# Patient Record
Sex: FEMALE | URBAN - METROPOLITAN AREA
[De-identification: names, ages, dates, MRNs, and addresses within clinical notes are randomized per-mention and may not be internally consistent; named-entity substitution may affect disease eponyms.]

---

## 2022-03-28 ENCOUNTER — APPOINTMENT (OUTPATIENT)
Dept: URBAN - METROPOLITAN AREA CLINIC 260 | Age: 27
Setting detail: DERMATOLOGY
End: 2022-03-29

## 2022-03-28 VITALS — HEIGHT: 66 IN | WEIGHT: 180 LBS

## 2022-03-28 DIAGNOSIS — Q819 OTHER SPECIFIED ANOMALIES OF SKIN: ICD-10-CM

## 2022-03-28 DIAGNOSIS — Q826 OTHER SPECIFIED ANOMALIES OF SKIN: ICD-10-CM

## 2022-03-28 DIAGNOSIS — L663 OTHER SPECIFIED DISEASES OF HAIR AND HAIR FOLLICLES: ICD-10-CM

## 2022-03-28 DIAGNOSIS — L738 OTHER SPECIFIED DISEASES OF HAIR AND HAIR FOLLICLES: ICD-10-CM

## 2022-03-28 DIAGNOSIS — Q828 OTHER SPECIFIED ANOMALIES OF SKIN: ICD-10-CM

## 2022-03-28 PROBLEM — L02.221 FURUNCLE OF ABDOMINAL WALL: Status: ACTIVE | Noted: 2022-03-28

## 2022-03-28 PROBLEM — L85.8 OTHER SPECIFIED EPIDERMAL THICKENING: Status: ACTIVE | Noted: 2022-03-28

## 2022-03-28 PROCEDURE — OTHER PRESCRIPTION: OTHER

## 2022-03-28 PROCEDURE — OTHER ADDITIONAL NOTES: OTHER

## 2022-03-28 PROCEDURE — OTHER COUNSELING: OTHER

## 2022-03-28 PROCEDURE — 99204 OFFICE O/P NEW MOD 45 MIN: CPT

## 2022-03-28 PROCEDURE — OTHER MIPS QUALITY: OTHER

## 2022-03-28 PROCEDURE — OTHER PHOTO-DOCUMENTATION: OTHER

## 2022-03-28 PROCEDURE — OTHER EDUCATIONAL RESOURCES PROVIDED: OTHER

## 2022-03-28 PROCEDURE — OTHER PRESCRIPTION MEDICATION MANAGEMENT: OTHER

## 2022-03-28 RX ORDER — CEPHALEXIN 500 MG/1
500 MG CAPSULE ORAL BID
Qty: 60 | Refills: 1 | Status: ERX | COMMUNITY
Start: 2022-03-28

## 2022-03-28 ASSESSMENT — BSA RASH: BSA RASH: 10

## 2022-03-28 ASSESSMENT — LOCATION ZONE DERM
LOCATION ZONE: ARM
LOCATION ZONE: TRUNK

## 2022-03-28 ASSESSMENT — LOCATION DETAILED DESCRIPTION DERM
LOCATION DETAILED: LEFT INFRAMAMMARY CREASE (INNER QUADRANT)
LOCATION DETAILED: LEFT PROXIMAL POSTERIOR UPPER ARM

## 2022-03-28 ASSESSMENT — LOCATION SIMPLE DESCRIPTION DERM
LOCATION SIMPLE: LEFT POSTERIOR UPPER ARM
LOCATION SIMPLE: LEFT BREAST

## 2022-03-28 ASSESSMENT — SEVERITY ASSESSMENT: SEVERITY: MILD TO MODERATE

## 2022-03-28 NOTE — PROCEDURE: PRESCRIPTION MEDICATION MANAGEMENT
Detail Level: Simple
Render In Strict Bullet Format?: No
Initiate Treatment: Cephalexin 500 mg BID
Plan: Return in 1 month for recheck.

## 2022-03-28 NOTE — PROCEDURE: ADDITIONAL NOTES
Detail Level: Zone
Additional Notes: Patient given options for OTC scrubs of the arms every other day and use OTC lotions and creams to apply topically to arms. Patient verbalized understanding.
Render Risk Assessment In Note?: no

## 2022-04-27 ENCOUNTER — APPOINTMENT (OUTPATIENT)
Dept: URBAN - METROPOLITAN AREA CLINIC 260 | Age: 27
Setting detail: DERMATOLOGY
End: 2022-04-29

## 2022-04-27 VITALS — HEIGHT: 66 IN | WEIGHT: 179 LBS

## 2022-04-27 DIAGNOSIS — L738 OTHER SPECIFIED DISEASES OF HAIR AND HAIR FOLLICLES: ICD-10-CM

## 2022-04-27 DIAGNOSIS — L663 OTHER SPECIFIED DISEASES OF HAIR AND HAIR FOLLICLES: ICD-10-CM

## 2022-04-27 DIAGNOSIS — D49.2 NEOPLASM OF UNSPECIFIED BEHAVIOR OF BONE, SOFT TISSUE, AND SKIN: ICD-10-CM

## 2022-04-27 DIAGNOSIS — L50.3 DERMATOGRAPHIC URTICARIA: ICD-10-CM

## 2022-04-27 PROBLEM — L02.222 FURUNCLE OF BACK [ANY PART, EXCEPT BUTTOCK]: Status: ACTIVE | Noted: 2022-04-27

## 2022-04-27 PROCEDURE — OTHER ORDER TESTS: OTHER

## 2022-04-27 PROCEDURE — OTHER COUNSELING: OTHER

## 2022-04-27 PROCEDURE — 11102 TANGNTL BX SKIN SINGLE LES: CPT

## 2022-04-27 PROCEDURE — 99213 OFFICE O/P EST LOW 20 MIN: CPT | Mod: 25

## 2022-04-27 PROCEDURE — OTHER ADDITIONAL NOTES: OTHER

## 2022-04-27 PROCEDURE — OTHER BIOPSY BY SHAVE METHOD: OTHER

## 2022-04-27 ASSESSMENT — LOCATION DETAILED DESCRIPTION DERM
LOCATION DETAILED: LEFT INFERIOR MEDIAL UPPER BACK
LOCATION DETAILED: SUPERIOR THORACIC SPINE
LOCATION DETAILED: LEFT MEDIAL BREAST 10-11:00 REGION

## 2022-04-27 ASSESSMENT — BSA RASH: BSA RASH: 6

## 2022-04-27 ASSESSMENT — LOCATION SIMPLE DESCRIPTION DERM
LOCATION SIMPLE: LEFT BREAST
LOCATION SIMPLE: LEFT UPPER BACK
LOCATION SIMPLE: UPPER BACK

## 2022-04-27 ASSESSMENT — LOCATION ZONE DERM: LOCATION ZONE: TRUNK

## 2022-04-27 ASSESSMENT — SEVERITY ASSESSMENT: SEVERITY: MILD

## 2022-04-27 NOTE — PROCEDURE: BIOPSY BY SHAVE METHOD
Validate Triangulation: No
Size Of Lesion In Cm: 0
Electrodesiccation And Curettage Text: The wound bed was treated with electrodesiccation and curettage after the biopsy was performed.
Biopsy Method: Dermablade
Consent: Written consent was obtained and risks were reviewed including but not limited to scarring, infection, bleeding, scabbing, incomplete removal, nerve damage and allergy to anesthesia.
Notification Instructions: Patient will be notified of biopsy results. However, patient instructed to call the office if not contacted within 2 weeks.
Detail Level: Detailed
Cryotherapy Text: The wound bed was treated with cryotherapy after the biopsy was performed.
Dressing: bandage
Curettage Text: The wound bed was treated with curettage after the biopsy was performed.
Post-Care Instructions: I reviewed with the patient in detail post-care instructions. Patient is to keep the biopsy site dry overnight, and then apply vaseline and clean bandage daily.
Anesthesia Volume In Cc (Will Not Render If 0): 0.3
Was A Bandage Applied: Yes
Electrodesiccation Text: The wound bed was treated with electrodesiccation after the biopsy was performed.
Hemostasis: Drysol
Biopsy Type: H and E
Type Of Destruction Used: Curettage
Anesthesia Type: 1% lidocaine with epinephrine
Depth Of Biopsy: dermis
Wound Care: Petrolatum
Billing Type: Third-Party Bill
Information: Selecting Yes will display possible errors in your note based on the variables you have selected. This validation is only offered as a suggestion for you. PLEASE NOTE THAT THE VALIDATION TEXT WILL BE REMOVED WHEN YOU FINALIZE YOUR NOTE. IF YOU WANT TO FAX A PRELIMINARY NOTE YOU WILL NEED TO TOGGLE THIS TO 'NO' IF YOU DO NOT WANT IT IN YOUR FAXED NOTE.
Silver Nitrate Text: The wound bed was treated with silver nitrate after the biopsy was performed.

## 2022-04-27 NOTE — PROCEDURE: ADDITIONAL NOTES
Additional Notes: Recommended taking a daily allergy medication.
Render Risk Assessment In Note?: no
Detail Level: Zone

## 2024-12-26 ENCOUNTER — LAB REQUISITION (OUTPATIENT)
Dept: LAB | Facility: CLINIC | Age: 29
End: 2024-12-26
Payer: COMMERCIAL

## 2024-12-26 DIAGNOSIS — R20.2 PARESTHESIA OF SKIN: ICD-10-CM

## 2024-12-26 LAB
ALBUMIN SERPL BCG-MCNC: 4.5 G/DL (ref 3.5–5.2)
ALP SERPL-CCNC: 93 U/L (ref 40–150)
ALT SERPL W P-5'-P-CCNC: 21 U/L (ref 0–50)
ANION GAP SERPL CALCULATED.3IONS-SCNC: 13 MMOL/L (ref 7–15)
AST SERPL W P-5'-P-CCNC: 18 U/L (ref 0–45)
BASOPHILS # BLD AUTO: 0.1 10E3/UL (ref 0–0.2)
BASOPHILS NFR BLD AUTO: 1 %
BILIRUB SERPL-MCNC: 0.2 MG/DL
BUN SERPL-MCNC: 10.1 MG/DL (ref 6–20)
CALCIUM SERPL-MCNC: 9.2 MG/DL (ref 8.8–10.4)
CHLORIDE SERPL-SCNC: 103 MMOL/L (ref 98–107)
CREAT SERPL-MCNC: 0.73 MG/DL (ref 0.51–0.95)
EGFRCR SERPLBLD CKD-EPI 2021: >90 ML/MIN/1.73M2
EOSINOPHIL # BLD AUTO: 0.1 10E3/UL (ref 0–0.7)
EOSINOPHIL NFR BLD AUTO: 1 %
ERYTHROCYTE [DISTWIDTH] IN BLOOD BY AUTOMATED COUNT: 12.6 % (ref 10–15)
GLUCOSE SERPL-MCNC: 92 MG/DL (ref 70–99)
HCO3 SERPL-SCNC: 22 MMOL/L (ref 22–29)
HCT VFR BLD AUTO: 46.5 % (ref 35–47)
HGB BLD-MCNC: 14.9 G/DL (ref 11.7–15.7)
IMM GRANULOCYTES # BLD: 0.1 10E3/UL
IMM GRANULOCYTES NFR BLD: 1 %
LYMPHOCYTES # BLD AUTO: 2.3 10E3/UL (ref 0.8–5.3)
LYMPHOCYTES NFR BLD AUTO: 27 %
MCH RBC QN AUTO: 30.2 PG (ref 26.5–33)
MCHC RBC AUTO-ENTMCNC: 32 G/DL (ref 31.5–36.5)
MCV RBC AUTO: 94 FL (ref 78–100)
MONOCYTES # BLD AUTO: 0.6 10E3/UL (ref 0–1.3)
MONOCYTES NFR BLD AUTO: 7 %
NEUTROPHILS # BLD AUTO: 5.4 10E3/UL (ref 1.6–8.3)
NEUTROPHILS NFR BLD AUTO: 64 %
NRBC # BLD AUTO: 0 10E3/UL
NRBC BLD AUTO-RTO: 0 /100
PLATELET # BLD AUTO: 361 10E3/UL (ref 150–450)
POTASSIUM SERPL-SCNC: 4.1 MMOL/L (ref 3.4–5.3)
PROT SERPL-MCNC: 7.4 G/DL (ref 6.4–8.3)
RBC # BLD AUTO: 4.94 10E6/UL (ref 3.8–5.2)
SODIUM SERPL-SCNC: 138 MMOL/L (ref 135–145)
TSH SERPL DL<=0.005 MIU/L-ACNC: 1.25 UIU/ML (ref 0.3–4.2)
WBC # BLD AUTO: 8.5 10E3/UL (ref 4–11)

## 2024-12-26 PROCEDURE — 84443 ASSAY THYROID STIM HORMONE: CPT | Mod: ORL | Performed by: FAMILY MEDICINE

## 2024-12-26 PROCEDURE — 85025 COMPLETE CBC W/AUTO DIFF WBC: CPT | Mod: ORL | Performed by: FAMILY MEDICINE

## 2024-12-26 PROCEDURE — 80053 COMPREHEN METABOLIC PANEL: CPT | Mod: ORL | Performed by: FAMILY MEDICINE

## 2024-12-31 PROBLEM — N62 SYMPTOMATIC MAMMARY HYPERTROPHY: Status: ACTIVE | Noted: 2024-12-31

## 2024-12-31 PROBLEM — E78.5 HYPERLIPIDEMIA: Status: ACTIVE | Noted: 2017-03-02

## 2024-12-31 PROBLEM — G43.909 MIGRAINE SYNDROME: Status: ACTIVE | Noted: 2024-08-07

## 2025-01-02 ENCOUNTER — OFFICE VISIT (OUTPATIENT)
Dept: FAMILY MEDICINE | Facility: CLINIC | Age: 30
End: 2025-01-02
Payer: COMMERCIAL

## 2025-01-02 ENCOUNTER — ORDERS ONLY (AUTO-RELEASED) (OUTPATIENT)
Dept: FAMILY MEDICINE | Facility: CLINIC | Age: 30
End: 2025-01-02

## 2025-01-02 VITALS
RESPIRATION RATE: 18 BRPM | BODY MASS INDEX: 29.54 KG/M2 | SYSTOLIC BLOOD PRESSURE: 122 MMHG | OXYGEN SATURATION: 98 % | TEMPERATURE: 98.8 F | DIASTOLIC BLOOD PRESSURE: 86 MMHG | WEIGHT: 183.8 LBS | HEIGHT: 66 IN | HEART RATE: 106 BPM

## 2025-01-02 DIAGNOSIS — R00.0 TACHYCARDIA: ICD-10-CM

## 2025-01-02 DIAGNOSIS — R29.90 NEUROLOGICAL SYMPTOMS: Primary | ICD-10-CM

## 2025-01-02 ASSESSMENT — PAIN SCALES - GENERAL: PAINLEVEL_OUTOF10: SEVERE PAIN (6)
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
## 2025-01-14 LAB — CV ZIO PRELIM RESULTS: NORMAL

## 2025-02-07 PROBLEM — Z30.41 ENCOUNTER FOR SURVEILLANCE OF CONTRACEPTIVE PILLS: Status: ACTIVE | Noted: 2025-02-07

## 2025-02-08 ENCOUNTER — MYC MEDICAL ADVICE (OUTPATIENT)
Dept: FAMILY MEDICINE | Facility: CLINIC | Age: 30
End: 2025-02-08
Payer: COMMERCIAL

## 2025-02-08 DIAGNOSIS — R00.0 TACHYCARDIA: Primary | ICD-10-CM

## 2025-02-10 PROCEDURE — 99285 EMERGENCY DEPT VISIT HI MDM: CPT | Mod: 25

## 2025-02-11 ENCOUNTER — HOSPITAL ENCOUNTER (EMERGENCY)
Facility: HOSPITAL | Age: 30
Discharge: HOME | End: 2025-02-11
Attending: EMERGENCY MEDICINE | Admitting: EMERGENCY MEDICINE
Payer: COMMERCIAL

## 2025-02-11 ENCOUNTER — APPOINTMENT (OUTPATIENT)
Dept: CT IMAGING | Facility: HOSPITAL | Age: 30
End: 2025-02-11
Attending: EMERGENCY MEDICINE
Payer: COMMERCIAL

## 2025-02-11 VITALS
HEIGHT: 66 IN | DIASTOLIC BLOOD PRESSURE: 82 MMHG | HEART RATE: 90 BPM | BODY MASS INDEX: 29.63 KG/M2 | OXYGEN SATURATION: 97 % | WEIGHT: 184.4 LBS | TEMPERATURE: 97.3 F | RESPIRATION RATE: 18 BRPM | SYSTOLIC BLOOD PRESSURE: 125 MMHG

## 2025-02-11 DIAGNOSIS — R10.84 DIFFUSE ABDOMINAL PAIN: ICD-10-CM

## 2025-02-11 DIAGNOSIS — N63.0 BREAST NODULE: ICD-10-CM

## 2025-02-11 DIAGNOSIS — R11.2 NAUSEA AND VOMITING, UNSPECIFIED VOMITING TYPE: ICD-10-CM

## 2025-02-11 LAB
ALBUMIN SERPL BCG-MCNC: 4.8 G/DL (ref 3.5–5.2)
ALP SERPL-CCNC: 107 U/L (ref 40–150)
ALT SERPL W P-5'-P-CCNC: 16 U/L (ref 0–50)
ANION GAP SERPL CALCULATED.3IONS-SCNC: 17 MMOL/L (ref 7–15)
AST SERPL W P-5'-P-CCNC: 14 U/L (ref 0–45)
BASOPHILS # BLD AUTO: 0.1 10E3/UL (ref 0–0.2)
BASOPHILS NFR BLD AUTO: 0 %
BILIRUB DIRECT SERPL-MCNC: <0.2 MG/DL (ref 0–0.3)
BILIRUB SERPL-MCNC: 0.9 MG/DL
BUN SERPL-MCNC: 16.4 MG/DL (ref 6–20)
CALCIUM SERPL-MCNC: 9.8 MG/DL (ref 8.8–10.4)
CHLORIDE SERPL-SCNC: 102 MMOL/L (ref 98–107)
CREAT SERPL-MCNC: 0.7 MG/DL (ref 0.51–0.95)
EGFRCR SERPLBLD CKD-EPI 2021: >90 ML/MIN/1.73M2
EOSINOPHIL # BLD AUTO: 0.1 10E3/UL (ref 0–0.7)
EOSINOPHIL NFR BLD AUTO: 0 %
ERYTHROCYTE [DISTWIDTH] IN BLOOD BY AUTOMATED COUNT: 12.5 % (ref 10–15)
GLUCOSE SERPL-MCNC: 131 MG/DL (ref 70–99)
HCG SERPL QL: NEGATIVE
HCO3 SERPL-SCNC: 19 MMOL/L (ref 22–29)
HCT VFR BLD AUTO: 45.4 % (ref 35–47)
HGB BLD-MCNC: 15.9 G/DL (ref 11.7–15.7)
HOLD SPECIMEN: NORMAL
IMM GRANULOCYTES # BLD: 0.1 10E3/UL
IMM GRANULOCYTES NFR BLD: 1 %
LIPASE SERPL-CCNC: 19 U/L (ref 13–60)
LYMPHOCYTES # BLD AUTO: 0.7 10E3/UL (ref 0.8–5.3)
LYMPHOCYTES NFR BLD AUTO: 3 %
MCH RBC QN AUTO: 30.3 PG (ref 26.5–33)
MCHC RBC AUTO-ENTMCNC: 35 G/DL (ref 31.5–36.5)
MCV RBC AUTO: 87 FL (ref 78–100)
MONOCYTES # BLD AUTO: 1.3 10E3/UL (ref 0–1.3)
MONOCYTES NFR BLD AUTO: 6 %
NEUTROPHILS # BLD AUTO: 19.4 10E3/UL (ref 1.6–8.3)
NEUTROPHILS NFR BLD AUTO: 89 %
NRBC # BLD AUTO: 0 10E3/UL
NRBC BLD AUTO-RTO: 0 /100
PLATELET # BLD AUTO: 401 10E3/UL (ref 150–450)
POTASSIUM SERPL-SCNC: 4.1 MMOL/L (ref 3.4–5.3)
PROT SERPL-MCNC: 8 G/DL (ref 6.4–8.3)
RBC # BLD AUTO: 5.24 10E6/UL (ref 3.8–5.2)
SODIUM SERPL-SCNC: 138 MMOL/L (ref 135–145)
WBC # BLD AUTO: 21.7 10E3/UL (ref 4–11)

## 2025-02-11 PROCEDURE — 250N000011 HC RX IP 250 OP 636: Performed by: EMERGENCY MEDICINE

## 2025-02-11 PROCEDURE — 80053 COMPREHEN METABOLIC PANEL: CPT | Performed by: EMERGENCY MEDICINE

## 2025-02-11 PROCEDURE — 84703 CHORIONIC GONADOTROPIN ASSAY: CPT | Performed by: EMERGENCY MEDICINE

## 2025-02-11 PROCEDURE — 96374 THER/PROPH/DIAG INJ IV PUSH: CPT | Mod: 59

## 2025-02-11 PROCEDURE — 74177 CT ABD & PELVIS W/CONTRAST: CPT

## 2025-02-11 PROCEDURE — 258N000003 HC RX IP 258 OP 636: Performed by: EMERGENCY MEDICINE

## 2025-02-11 PROCEDURE — 96361 HYDRATE IV INFUSION ADD-ON: CPT

## 2025-02-11 PROCEDURE — 85025 COMPLETE CBC W/AUTO DIFF WBC: CPT | Performed by: EMERGENCY MEDICINE

## 2025-02-11 PROCEDURE — 36415 COLL VENOUS BLD VENIPUNCTURE: CPT | Performed by: EMERGENCY MEDICINE

## 2025-02-11 PROCEDURE — 80048 BASIC METABOLIC PNL TOTAL CA: CPT | Performed by: EMERGENCY MEDICINE

## 2025-02-11 PROCEDURE — 96375 TX/PRO/DX INJ NEW DRUG ADDON: CPT

## 2025-02-11 PROCEDURE — 250N000011 HC RX IP 250 OP 636: Performed by: STUDENT IN AN ORGANIZED HEALTH CARE EDUCATION/TRAINING PROGRAM

## 2025-02-11 PROCEDURE — 83690 ASSAY OF LIPASE: CPT | Performed by: EMERGENCY MEDICINE

## 2025-02-11 PROCEDURE — 82248 BILIRUBIN DIRECT: CPT | Performed by: EMERGENCY MEDICINE

## 2025-02-11 RX ORDER — IOPAMIDOL 755 MG/ML
90 INJECTION, SOLUTION INTRAVASCULAR ONCE
Status: COMPLETED | OUTPATIENT
Start: 2025-02-11 | End: 2025-02-11

## 2025-02-11 RX ORDER — KETOROLAC TROMETHAMINE 15 MG/ML
15 INJECTION, SOLUTION INTRAMUSCULAR; INTRAVENOUS ONCE
Status: COMPLETED | OUTPATIENT
Start: 2025-02-11 | End: 2025-02-11

## 2025-02-11 RX ORDER — ONDANSETRON 4 MG/1
4 TABLET, ORALLY DISINTEGRATING ORAL EVERY 8 HOURS PRN
Qty: 10 TABLET | Refills: 0 | Status: SHIPPED | OUTPATIENT
Start: 2025-02-11

## 2025-02-11 RX ORDER — ONDANSETRON 2 MG/ML
4 INJECTION INTRAMUSCULAR; INTRAVENOUS ONCE
Status: COMPLETED | OUTPATIENT
Start: 2025-02-11 | End: 2025-02-11

## 2025-02-11 RX ADMIN — IOPAMIDOL 90 ML: 755 INJECTION, SOLUTION INTRAVENOUS at 02:02

## 2025-02-11 RX ADMIN — KETOROLAC TROMETHAMINE 15 MG: 15 INJECTION, SOLUTION INTRAMUSCULAR; INTRAVENOUS at 01:38

## 2025-02-11 RX ADMIN — SODIUM CHLORIDE, POTASSIUM CHLORIDE, SODIUM LACTATE AND CALCIUM CHLORIDE 1000 ML: 600; 310; 30; 20 INJECTION, SOLUTION INTRAVENOUS at 01:41

## 2025-02-11 RX ADMIN — ONDANSETRON 4 MG: 2 INJECTION, SOLUTION INTRAMUSCULAR; INTRAVENOUS at 00:44

## 2025-02-11 ASSESSMENT — ACTIVITIES OF DAILY LIVING (ADL)
ADLS_ACUITY_SCORE: 41

## 2025-02-11 ASSESSMENT — COLUMBIA-SUICIDE SEVERITY RATING SCALE - C-SSRS
6. HAVE YOU EVER DONE ANYTHING, STARTED TO DO ANYTHING, OR PREPARED TO DO ANYTHING TO END YOUR LIFE?: NO
2. HAVE YOU ACTUALLY HAD ANY THOUGHTS OF KILLING YOURSELF IN THE PAST MONTH?: NO
1. IN THE PAST MONTH, HAVE YOU WISHED YOU WERE DEAD OR WISHED YOU COULD GO TO SLEEP AND NOT WAKE UP?: NO

## 2025-02-11 ASSESSMENT — ENCOUNTER SYMPTOMS
DIARRHEA: 0
ABDOMINAL PAIN: 1
COUGH: 0
FEVER: 0
DYSURIA: 0
SHORTNESS OF BREATH: 0
NAUSEA: 1
VOMITING: 1

## 2025-02-11 NOTE — ED PROVIDER NOTES
EMERGENCY DEPARTMENT ENCOUNTER      NAME: Mary Lema  AGE: 29 year old female  YOB: 1995  MRN: [id number]  EVALUATION DATE & TIME: 2/11/2025 12:51 AM    PCP: No Ref-Primary, Physician    ED PROVIDER: Veronique Pérez M.D.        Chief Complaint   Patient presents with    Abdominal Pain    Emesis    Anxiety         FINAL IMPRESSION:    1. Diffuse abdominal pain    2. Nausea and vomiting, unspecified vomiting type    3. Breast nodule            MEDICAL DECISION MAKING:    Mary Lema is a 29 year old female with history of migraines, obesity, hyperlipidemia, sinus tachycardia, status post breast reduction, who presents to the ER with complaints of abdominal pain and vomiting.    CT scan reassuring.  Incidental breast nodule seen and patient will follow-up with primary care as an outpatient with regards to those.    I suspect her vomiting abdominal discomfort is due to a viral illness.  Discussed with her that she may even develop some diarrhea.  Patient will be discharged home with prescription for Zofran, work note provided.  At this time I feel she is otherwise appropriate for discharge home.      ED COURSE:  1:15 AM  I met with the patient to gather history and perform my exam. ED course and treatment discussed.    3:14 AM  Updated patient all results including the breast nodules.  She states she has had a breast reduction surgery and wondering if this is related.  She will touch base with her primary care doctor to see if further imaging is necessary.    Otherwise rest of her imaging looks good.  Laboratories look good.  WBC elevated though likely due to demargination from the vomiting she was reporting.  Here vomiting has been controlled.  She has a history of tachycardia and recently wore a Zio patch which showed her average heart rate about 103 bpm which is consistent with here in the ER.  At this time I feel she is appropriate for discharge home to follow with primary care as an  outpatient with regards to the breast nodules.  Prescription for Zofran has been provided.  Patient understands what to watch for and when to return to the ER and all of her questions have been answered.      At the conclusion of the encounter I discussed the results of all of the tests and the disposition. Their questions were answered. The patient (and any family present) acknowledged understanding and were agreeable with the care plan.      CONSULTANTS:  Jacobo        MEDICATIONS GIVEN IN THE EMERGENCY:  Medications   ondansetron (ZOFRAN) injection 4 mg (4 mg Intravenous $Given 2/11/25 0044)   lactated ringers BOLUS 1,000 mL (0 mLs Intravenous Stopped 2/11/25 0314)   ketorolac (TORADOL) injection 15 mg (15 mg Intravenous $Given 2/11/25 0138)   iopamidol (ISOVUE-370) solution 90 mL (90 mLs Intravenous $Given 2/11/25 0202)           NEW PRESCRIPTIONS STARTED AT TODAY'S ER VISIT     Medication List        Started      ondansetron 4 MG ODT tab  Commonly known as: ZOFRAN ODT  4 mg, Oral, EVERY 8 HOURS PRN                  CONDITION:  Stable, improving        DISPOSITION:  D.c home         =================================================================  =================================================================  TRIAGE ASSESSMENT:  Pt reports she has been having abd pain since 1900. Has had 2 emesis since 2100.  Pt denies diarrhea.  Pt reports she recently had increased her anxiety medication.  Pt reports she just started a new birth control yesterday.        ED Triage Vitals [02/11/25 0005]   Encounter Vitals Group      BP (!) 147/98      Systolic BP Percentile       Diastolic BP Percentile       Pulse 120      Resp 18      Temp 97.3  F (36.3  C)      Temp src Temporal      SpO2 97 %      Weight 83.6 kg (184 lb 6.4 oz)         ================================================================  ================================================================    HPI    Patient information was obtained from:  patient    Use of Intrepreter: N/A      Mary Lema is a 29 year old female with history of migraines, obesity, hyperlipidemia, sinus tachycardia, status post breast reduction, who presents to the ER with complaints of abdominal pain and vomiting.    About 5 hours prior to arrival she started developing some nausea and then about 3 hours ago had multiple episodes of vomiting.  Continues to have some abdominal discomfort ever since.    Otherwise denies fevers, cough, chest pain, shortness of breath, diarrhea, dysuria, hematuria.    CHART REVIEW:  Conclusion  Zio monitoring from 1/6/2025 to 1/9/2025 (duration 3d).  Predominant underlying rhythm was sinus rhythm, 70 to 171bpm, average 103bpm.  No nonsustained or sustained tachyarrhythmias.  No atrial fibrillation.  There were no pauses of greater than 3 seconds.  Rare supraventricular ectopic beats (<1%).  Rare premature ventricular contractions (<1%).  Symptom triggers correlated sinus tachycardia .     Electronically signed by Berenice Worthington MD  1/14/2025  5:32 PM  ========================    REVIEW OF SYSTEMS  Review of Systems   Constitutional:  Negative for fever.   Respiratory:  Negative for cough and shortness of breath.    Cardiovascular:  Negative for chest pain.   Gastrointestinal:  Positive for abdominal pain, nausea and vomiting. Negative for diarrhea.   Genitourinary:  Negative for dysuria.   All other systems reviewed and are negative.      PAST MEDICAL HISTORY:  History reviewed. No pertinent past medical history.      PAST SURGICAL HISTORY:  History reviewed. No pertinent surgical history.      CURRENT MEDICATIONS:    Prior to Admission medications    Medication Sig Start Date End Date Taking? Authorizing Provider   amitriptyline (ELAVIL) 10 MG tablet Take 10 mg by mouth daily. 9/20/23   Reported, Patient   buPROPion (WELLBUTRIN XL) 300 MG 24 hr tablet Take 300 mg by mouth. 9/17/24   Reported, Patient   busPIRone (BUSPAR) 10 MG tablet Take 15  mg by mouth 3 times daily. 12/3/24   Reported, Patient   busPIRone (BUSPAR) 15 MG tablet Take 15 mg by mouth 3 times daily. 2/5/25   Reported, Patient   hydrOXYzine macarena (VISTARIL) 25 MG capsule Take 25 mg by mouth 3 times daily as needed (migraine). 9/17/24   Reported, Patient   norgestim-eth estrad triphasic (TRI-SPRINTEC) 0.18/0.215/0.25 MG-35 MCG tablet Take 1 tablet by mouth daily. 2/7/25   Pushpa Lugo MD         ALLERGIES:  No Known Allergies      FAMILY HISTORY:  History reviewed. No pertinent family history.      SOCIAL HISTORY:  Social History     Socioeconomic History    Marital status: Single   Tobacco Use    Smoking status: Never    Smokeless tobacco: Never   Vaping Use    Vaping status: Some Days     Social Drivers of Health     Financial Resource Strain: Low Risk  (1/2/2025)    Financial Resource Strain     Within the past 12 months, have you or your family members you live with been unable to get utilities (heat, electricity) when it was really needed?: No   Food Insecurity: Low Risk  (1/2/2025)    Food Insecurity     Within the past 12 months, did you worry that your food would run out before you got money to buy more?: No     Within the past 12 months, did the food you bought just not last and you didn t have money to get more?: No   Transportation Needs: Low Risk  (1/2/2025)    Transportation Needs     Within the past 12 months, has lack of transportation kept you from medical appointments, getting your medicines, non-medical meetings or appointments, work, or from getting things that you need?: No   Social Connections: Socially Integrated (5/13/2024)    Received from [facility]    Social Connections     Do you often feel lonely or isolated from those around you?: 0   Interpersonal Safety: Low Risk  (1/2/2025)    Interpersonal Safety     Do you feel physically and emotionally safe where you currently live?: Yes     Within the past 12 months, have you  "been hit, slapped, kicked or otherwise physically hurt by someone?: No     Within the past 12 months, have you been humiliated or emotionally abused in other ways by your partner or ex-partner?: No   Housing Stability: Low Risk  (1/2/2025)    Housing Stability     Do you have housing? : Yes     Are you worried about losing your housing?: No         VITALS:  Patient Vitals for the past 24 hrs:   BP Temp Temp src Pulse Resp SpO2 Height Weight   02/11/25 0311 -- -- -- 103 -- -- -- --   02/11/25 0305 118/78 -- -- 107 -- 97 % -- --   02/11/25 0230 118/80 -- -- 105 -- 96 % -- --   02/11/25 0220 130/82 -- -- 104 -- 97 % -- --   02/11/25 0130 121/80 -- -- 114 -- 96 % -- --   02/11/25 0128 -- -- -- -- -- -- 1.676 m (5' 6\") --   02/11/25 0005 (!) 147/98 97.3  F (36.3  C) Temporal 120 18 97 % -- 83.6 kg (184 lb 6.4 oz)       Wt Readings from Last 3 Encounters:   02/11/25 83.6 kg (184 lb 6.4 oz)   01/02/25 83.4 kg (183 lb 12.8 oz)       Estimated Creatinine Clearance: 131.5 mL/min (based on SCr of 0.7 mg/dL).    PHYSICAL EXAM    Constitutional:  Well developed, Well nourished, NAD  HENT:  Normocephalic, Atraumatic, Bilateral external ears normal, Nose normal. Neck- Supple, No stridor.   Eyes:  PERRL, EOMI, Conjunctiva normal, No discharge.  Respiratory:  Normal breath sounds, No respiratory distress, No wheezing, Speaks full sentences easily. No cough.   Cardiovascular:  Normal heart rate, Regular rhythm, No murmurs , No rubs, No gallops.   GI:  +obesity.  Bowel sounds normal, Soft, +mild diffuse tenderness, No masses, No flank tenderness. No rebound or guarding.   : deferred  Musculoskeletal:  No cyanosis, No clubbing. Good range of motion in all major joints. No major deformities noted.  Integument:  Warm, Dry, No erythema, No rash.  No petechiae.   Neurologic:  Alert & oriented x 3  Psychiatric:  Affect normal, Cooperative         LAB:  All pertinent labs reviewed and interpreted.  Recent Results (from the past 24 hours) "   Basic metabolic panel    Collection Time: 02/11/25 12:46 AM   Result Value Ref Range    Sodium 138 135 - 145 mmol/L    Potassium 4.1 3.4 - 5.3 mmol/L    Chloride 102 98 - 107 mmol/L    Carbon Dioxide (CO2) 19 (L) 22 - 29 mmol/L    Anion Gap 17 (H) 7 - 15 mmol/L    Urea Nitrogen 16.4 6.0 - 20.0 mg/dL    Creatinine 0.70 0.51 - 0.95 mg/dL    GFR Estimate >90 >60 mL/min/1.73m2    Calcium 9.8 8.8 - 10.4 mg/dL    Glucose 131 (H) 70 - 99 mg/dL   Hepatic function panel    Collection Time: 02/11/25 12:46 AM   Result Value Ref Range    Protein Total 8.0 6.4 - 8.3 g/dL    Albumin 4.8 3.5 - 5.2 g/dL    Bilirubin Total 0.9 <=1.2 mg/dL    Alkaline Phosphatase 107 40 - 150 U/L    AST 14 0 - 45 U/L    ALT 16 0 - 50 U/L    Bilirubin Direct <0.20 0.00 - 0.30 mg/dL   Lipase    Collection Time: 02/11/25 12:46 AM   Result Value Ref Range    Lipase 19 13 - 60 U/L   HCG QUALitative pregnancy (blood)    Collection Time: 02/11/25 12:46 AM   Result Value Ref Range    hCG Serum Qualitative Negative Negative   Extra Blue Top Tube    Collection Time: 02/11/25 12:46 AM   Result Value Ref Range    Hold Specimen JIC    Extra Red Top Tube    Collection Time: 02/11/25 12:46 AM   Result Value Ref Range    Hold Specimen JIC    Extra Green Top (Lithium Heparin) Tube    Collection Time: 02/11/25 12:46 AM   Result Value Ref Range    Hold Specimen JIC    Extra Blood Bank Purple Top Tube    Collection Time: 02/11/25 12:46 AM   Result Value Ref Range    Hold Specimen JIC    CBC with platelets and differential    Collection Time: 02/11/25 12:46 AM   Result Value Ref Range    WBC Count 21.7 (H) 4.0 - 11.0 10e3/uL    RBC Count 5.24 (H) 3.80 - 5.20 10e6/uL    Hemoglobin 15.9 (H) 11.7 - 15.7 g/dL    Hematocrit 45.4 35.0 - 47.0 %    MCV 87 78 - 100 fL    MCH 30.3 26.5 - 33.0 pg    MCHC 35.0 31.5 - 36.5 g/dL    RDW 12.5 10.0 - 15.0 %    Platelet Count 401 150 - 450 10e3/uL    % Neutrophils 89 %    % Lymphocytes 3 %    % Monocytes 6 %    % Eosinophils 0 %    %  "Basophils 0 %    % Immature Granulocytes 1 %    NRBCs per 100 WBC 0 <1 /100    Absolute Neutrophils 19.4 (H) 1.6 - 8.3 10e3/uL    Absolute Lymphocytes 0.7 (L) 0.8 - 5.3 10e3/uL    Absolute Monocytes 1.3 0.0 - 1.3 10e3/uL    Absolute Eosinophils 0.1 0.0 - 0.7 10e3/uL    Absolute Basophils 0.1 0.0 - 0.2 10e3/uL    Absolute Immature Granulocytes 0.1 <=0.4 10e3/uL    Absolute NRBCs 0.0 10e3/uL       No results found for: \"ABORH\"        RADIOLOGY:  Reviewed all pertinent imaging. Please see official radiology report.    CT Abdomen Pelvis w Contrast   Final Result   IMPRESSION:    1.  Liquid stool in the colon, likely reflecting diarrheal state. No colitis.   2.  Normal appendix.   3.  Statistically benign nodules in the bilateral breasts. Recommend formal breast examination and depending on patient preference and findings of breast examination, ultrasound performed at a formal breast center can be considered.            EKG:    none      PROCEDURES:  none    Medical Decision Making  Obtained supplemental history:Supplemental history obtained?: No  Reviewed external records: External records reviewed?: Documented in chart and Outpatient Record: see HPI  Care impacted by chronic illness:Documented in Chart  Did you consider but not order tests?: Work up considered but not performed and documented in chart, if applicable  Did you interpret images independently?: Independent interpretation of ECG and images noted in documentation, when applicable.  Consultation discussion with other provider:Did you involve another provider (consultant, , pharmacy, etc.)?: No  Discharge. I prescribed additional prescription strength medication(s) as charted. I considered admission, but ultimately discharged patient patient is hemodynamically stable, heart rate is at her baseline, blood pressure looks good, imaging reassuring, and laboratories look good.    MIPS: Not Applicable      Veronique Pérez M.D. FACEP  Emergency Medicine and " Medical Toxicology  [facility] EMERGENCY DEPARTMENT  [facility] [facility] [id number] [phone]  Dept: [phone]           Veronique Pérez MD  02/11/25 0316

## 2025-02-11 NOTE — Clinical Note
Mary Torey was seen and treated in our emergency department on 2/10/2025.  She may return to work on 02/14/2025.       If you have any questions or concerns, please don't hesitate to call.      Veronique Pérez MD

## 2025-02-11 NOTE — ED TRIAGE NOTES
Pt reports she has been having abd pain since 1900. Has had 2 emesis since 2100.  Pt denies diarrhea.  Pt reports she recently had increased her anxiety medication.  Pt reports she just started a new birth control yesterday.     Triage Assessment (Adult)       Row Name 02/11/25 0006          Triage Assessment    Airway WDL WDL        Respiratory WDL    Respiratory WDL WDL        Skin Circulation/Temperature WDL    Skin Circulation/Temperature WDL WDL        Cardiac WDL    Cardiac WDL WDL        Peripheral/Neurovascular WDL    Peripheral Neurovascular WDL WDL        Cognitive/Neuro/Behavioral WDL    Cognitive/Neuro/Behavioral WDL WDL

## 2025-02-11 NOTE — DISCHARGE INSTRUCTIONS
There area breast nodules seen on the CT scan.  Make an appointment to follow-up with primary care in the next week or so to discuss this.  They can order further imaging if necessary.    I suspect that your abdominal pain and vomiting is a viral stomach illness.  You can use the ondansetron as needed for any ongoing vomiting.    Return the emergency department if you develop worsening pain, worsening vomiting, concerns for dehydration, vomiting blood or bloody stools, or any other concerns.    Thank you for choosing [facility] Emergency Department.  It has been my pleasure caring for you today.     ~Dr. Elisa MD

## 2025-02-21 PROBLEM — K76.0 HEPATIC STEATOSIS: Status: ACTIVE | Noted: 2025-02-21

## 2025-02-21 PROBLEM — N63.0 BREAST NODULE: Status: ACTIVE | Noted: 2025-02-21

## 2025-02-23 ENCOUNTER — HOSPITAL ENCOUNTER (OUTPATIENT)
Dept: ULTRASOUND IMAGING | Facility: HOSPITAL | Age: 30
Discharge: HOME | End: 2025-02-23
Payer: COMMERCIAL

## 2025-02-23 DIAGNOSIS — R16.0 HEPATOMEGALY: ICD-10-CM

## 2025-02-23 DIAGNOSIS — K76.0 HEPATIC STEATOSIS: ICD-10-CM

## 2025-02-23 PROCEDURE — 76705 ECHO EXAM OF ABDOMEN: CPT

## 2025-02-27 ENCOUNTER — ANCILLARY PROCEDURE (OUTPATIENT)
Dept: MAMMOGRAPHY | Facility: CLINIC | Age: 30
End: 2025-02-27
Payer: COMMERCIAL

## 2025-02-27 DIAGNOSIS — N63.0 BREAST NODULE: ICD-10-CM

## 2025-02-27 PROCEDURE — 76642 ULTRASOUND BREAST LIMITED: CPT | Mod: RT

## 2025-03-04 ENCOUNTER — ANCILLARY PROCEDURE (OUTPATIENT)
Dept: MAMMOGRAPHY | Facility: CLINIC | Age: 30
End: 2025-03-04
Payer: COMMERCIAL

## 2025-03-04 DIAGNOSIS — N63.10 BREAST MASS, RIGHT: ICD-10-CM

## 2025-03-04 PROCEDURE — 999N000065 MA POST PROCEDURE RIGHT

## 2025-03-04 PROCEDURE — 272N000431 US BREAST BIOPSY CORE NEEDLE RIGHT

## 2025-03-04 PROCEDURE — A4648 IMPLANTABLE TISSUE MARKER: HCPCS

## 2025-03-04 PROCEDURE — 88305 TISSUE EXAM BY PATHOLOGIST: CPT | Mod: TC

## 2025-03-04 PROCEDURE — 250N000009 HC RX 250

## 2025-03-04 RX ADMIN — LIDOCAINE HYDROCHLORIDE 10 ML: 10 INJECTION, SOLUTION INFILTRATION; PERINEURAL at 09:58

## 2025-03-04 RX ADMIN — LIDOCAINE HYDROCHLORIDE 10 ML: 10 INJECTION, SOLUTION INFILTRATION; PERINEURAL at 10:07

## 2025-03-04 NOTE — DISCHARGE INSTRUCTIONS
After Your Breast Biopsy    Bleeding, bruising, and pain  Breast tenderness and some bruising is normal and may last several days. You may wear your bra overnight to support the breast.  You may use an ice pack for pain. Place it over the area for 15 to 20 minutes, several times a day.  You may take over-the-counter pain medicine:  On the day of the biopsy, we recommend Tylenol (acetaminophen) because it does not raise your risk of bleeding.  The next day, you may take an anti-inflammatory medicine (aspirin, ibuprofen, Motrin, Aleve, Advil), unless your doctor tells you not to.  Bandages and showering  Keep your bandage in place until tomorrow morning. Don't get it wet.  If you have small pieces of tape on the skin, leave them in place. They will fall off on their own, or you can remove them after 5 days.  You may shower the next morning after your biopsy.  Activity  No heavy activity (no running, no gym workouts, no lifting, no vacuuming, etc.) on the day of your biopsy.  You may go back to normal activity the next day. But limit what you do if you still have pain or discomfort.  Infection  Infection is rare. Signs of infection include:  Fever (including sweats and chills)  Redness  Pain that gets worse  Fluid draining from the biopsy site  Biopsy results  Results may take up to 5 business days.  A nurse or doctor from the Breast Center will call with your results. The system sends the results to the doctor that ordered your biopsy & MyChart automatically.     If you have not gotten your results in 5 days, please call the Breast Center.  Call the Breast Center with questions or if:   You have bleeding that lasts more than 20 minutes.  You have pain that you can't control.  You have signs of infection (fever, sweats, chills, redness, increasing pain, or drainage).  After hours, please call the doctor who ordered your biopsy.     Breast Center Nurse  [phone]    For informational purposes only. Not to replace the  advice of your health care provider. Copyright   2010 [place] [employer] [facility]. All rights reserved. Clinically reviewed by Kaye Niño, Director, [facility]. [employer] 207613 - REV 08/23.

## 2025-03-05 ENCOUNTER — TELEPHONE (OUTPATIENT)
Dept: MAMMOGRAPHY | Facility: CLINIC | Age: 30
End: 2025-03-05
Payer: COMMERCIAL

## 2025-03-05 LAB
PATH REPORT.COMMENTS IMP SPEC: NORMAL
PATH REPORT.FINAL DX SPEC: NORMAL
PATH REPORT.GROSS SPEC: NORMAL
PATH REPORT.MICROSCOPIC SPEC OTHER STN: NORMAL
PHOTO IMAGE: NORMAL

## 2025-03-05 PROCEDURE — 88305 TISSUE EXAM BY PATHOLOGIST: CPT | Mod: 26 | Performed by: PATHOLOGY

## 2025-03-05 NOTE — TELEPHONE ENCOUNTER
At the request of the Breast Center Radiologist, Dr. Martinez,  I phoned patient and discussed the benign breast biopsy results.      Patient is advised, per Dr. Martinez, a 6 month follow up right breast U/S is recommended.   Orders prepped and forwarded to provider for signing.        Patient denies any concerns at her biopsy site. Questions were answered and my phone number given if she has further questions or concerns.  Ordering provider was informed of these results.  Patient verbalized understanding and agrees with the plan of care.       Adri Mendoza, RN, BSN, PHN, CBCN  Breast Center Imaging Nurse Coordinator   [facility]  [address]  [place] [address]  [phone]

## 2025-03-08 DIAGNOSIS — N63.10 MASS OF RIGHT BREAST, UNSPECIFIED QUADRANT: Primary | ICD-10-CM

## 2025-03-10 ENCOUNTER — MYC MEDICAL ADVICE (OUTPATIENT)
Dept: FAMILY MEDICINE | Facility: CLINIC | Age: 30
End: 2025-03-10
Payer: COMMERCIAL

## 2025-03-16 ENCOUNTER — OFFICE VISIT (OUTPATIENT)
Dept: URGENT CARE | Facility: URGENT CARE | Age: 30
End: 2025-03-16

## 2025-03-16 VITALS
TEMPERATURE: 98.2 F | HEART RATE: 103 BPM | OXYGEN SATURATION: 97 % | RESPIRATION RATE: 12 BRPM | DIASTOLIC BLOOD PRESSURE: 86 MMHG | SYSTOLIC BLOOD PRESSURE: 136 MMHG

## 2025-03-16 DIAGNOSIS — M54.50 ACUTE LOW BACK PAIN WITHOUT SCIATICA, UNSPECIFIED BACK PAIN LATERALITY: Primary | ICD-10-CM

## 2025-03-16 PROCEDURE — 3075F SYST BP GE 130 - 139MM HG: CPT | Performed by: PHYSICIAN ASSISTANT

## 2025-03-16 PROCEDURE — 99213 OFFICE O/P EST LOW 20 MIN: CPT | Performed by: PHYSICIAN ASSISTANT

## 2025-03-16 PROCEDURE — 3079F DIAST BP 80-89 MM HG: CPT | Performed by: PHYSICIAN ASSISTANT

## 2025-03-16 RX ORDER — METHYLPREDNISOLONE 4 MG/1
TABLET ORAL
Qty: 21 TABLET | Refills: 0 | Status: SHIPPED | OUTPATIENT
Start: 2025-03-16

## 2025-03-16 RX ORDER — CYCLOBENZAPRINE HCL 5 MG
5 TABLET ORAL 3 TIMES DAILY PRN
Qty: 30 TABLET | Refills: 0 | Status: SHIPPED | OUTPATIENT
Start: 2025-03-16

## 2025-03-16 NOTE — PROGRESS NOTES
Assessment & Plan     Acute low back pain without sciatica, unspecified back pain laterality  Pt has no red flags.    Pt given medrol dose alice and muscle relaxant.   Start ibuprofen 800 mg when done with medrol  Continue tylenol, ice, heat, lidocaine patches.   Follow-up with pcp in 2 weeks for recheck if persistent sx.    At the end of the encounter, I discussed results, diagnosis, medications. Discussed red flags for immediate return to clinic/ER, as well as indications for follow up if no improvement. Patient understood and agreed to plan.       - cyclobenzaprine (FLEXERIL) 5 MG tablet  Dispense: 30 tablet; Refill: 0  - methylPREDNISolone (MEDROL DOSEPAK) 4 MG tablet therapy pack  Dispense: 21 tablet; Refill: 0  - Physical Therapy  Referral             Belinda Lilly PA-C  [facility] ELIECER Hernandez is a 30 year old female who presents to clinic today for the following health issues:  Chief Complaint   Patient presents with    Back Pain     Injured back while shoveling snow on 3/05/2025.        HPI  Pt is a 30 year old female who presents with concern re: back pain.    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   Started noting pain mid day while doing shoveling. Notified her employer at that time but was able to continue. Back pain has  Persists since.  Pain is low back, without radiation.    Stretching, bending, twisting makes it worse.    Sitting better than supine.    Position changes worse.   Keeps up at night at times.    Heat, ice, lidocaine patches, ibuprofen, tylenol not helpful.    No hx of chronic low back pain.    No T/N/W   No B/B function problems.   No fevers.    No saddle anesthesia.        Review of Systems  Constitutional, HEENT, cardiovascular, pulmonary, gi and gu systems are negative, except as otherwise noted.      Objective    /86   Pulse 103   Temp 98.2  F (36.8  C)  (Temporal)   Resp 12   LMP 06/28/2024 (Approximate)   SpO2 97%   Physical Exam   nad appears well  Ambulates well without antalgic gait, able to rise up and off exam table.   No midline T or L spine TTP.  Does have pain with palpation of the lower lumbar   perivertebral muscle TTP and mild SI pain noted B.    Muscular strength, sensation and DTR for LE are symetrical and WNL for the BLE  SLR negative B  Figure 4 negative B.   Peripheral pulses intact, cap refill brisk.      No results found for any visits on 03/16/25.

## 2025-03-16 NOTE — PATIENT INSTRUCTIONS
No ibuprofen while on the oral steroid pack.  When done can start ibuprofen 800 mg 3 x per day  Tylenol 1000 mg 4 x per day.    Ice, heat, lidocaine patches.    Muscle relaxant for pain, caution re: sedation.   Follow up with primary care in 2 weeks.

## 2025-03-19 ENCOUNTER — PATIENT OUTREACH (OUTPATIENT)
Dept: CARE COORDINATION | Facility: CLINIC | Age: 30
End: 2025-03-19
Payer: COMMERCIAL

## 2025-04-10 ENCOUNTER — E-VISIT (OUTPATIENT)
Dept: FAMILY MEDICINE | Facility: CLINIC | Age: 30
End: 2025-04-10
Payer: COMMERCIAL

## 2025-04-10 ASSESSMENT — PATIENT HEALTH QUESTIONNAIRE - PHQ9
SUM OF ALL RESPONSES TO PHQ QUESTIONS 1-9: 13
SUM OF ALL RESPONSES TO PHQ QUESTIONS 1-9: 13
10. IF YOU CHECKED OFF ANY PROBLEMS, HOW DIFFICULT HAVE THESE PROBLEMS MADE IT FOR YOU TO DO YOUR WORK, TAKE CARE OF THINGS AT HOME, OR GET ALONG WITH OTHER PEOPLE: SOMEWHAT DIFFICULT

## 2025-04-14 ENCOUNTER — PATIENT OUTREACH (OUTPATIENT)
Dept: CARE COORDINATION | Facility: CLINIC | Age: 30
End: 2025-04-14

## 2025-04-14 ENCOUNTER — VIRTUAL VISIT (OUTPATIENT)
Dept: FAMILY MEDICINE | Facility: CLINIC | Age: 30
End: 2025-04-14
Payer: COMMERCIAL

## 2025-04-14 DIAGNOSIS — F33.1 MODERATE EPISODE OF RECURRENT MAJOR DEPRESSIVE DISORDER (H): Primary | ICD-10-CM

## 2025-04-14 DIAGNOSIS — F41.1 GAD (GENERALIZED ANXIETY DISORDER): ICD-10-CM

## 2025-04-14 PROCEDURE — 96127 BRIEF EMOTIONAL/BEHAV ASSMT: CPT | Mod: 95

## 2025-04-14 PROCEDURE — 98006 SYNCH AUDIO-VIDEO EST MOD 30: CPT

## 2025-04-14 ASSESSMENT — ANXIETY QUESTIONNAIRES
1. FEELING NERVOUS, ANXIOUS, OR ON EDGE: SEVERAL DAYS
3. WORRYING TOO MUCH ABOUT DIFFERENT THINGS: NOT AT ALL
6. BECOMING EASILY ANNOYED OR IRRITABLE: NEARLY EVERY DAY
GAD7 TOTAL SCORE: 6
IF YOU CHECKED OFF ANY PROBLEMS ON THIS QUESTIONNAIRE, HOW DIFFICULT HAVE THESE PROBLEMS MADE IT FOR YOU TO DO YOUR WORK, TAKE CARE OF THINGS AT HOME, OR GET ALONG WITH OTHER PEOPLE: VERY DIFFICULT
GAD7 TOTAL SCORE: 6
5. BEING SO RESTLESS THAT IT IS HARD TO SIT STILL: NOT AT ALL
2. NOT BEING ABLE TO STOP OR CONTROL WORRYING: NOT AT ALL
7. FEELING AFRAID AS IF SOMETHING AWFUL MIGHT HAPPEN: SEVERAL DAYS

## 2025-04-14 ASSESSMENT — PATIENT HEALTH QUESTIONNAIRE - PHQ9
5. POOR APPETITE OR OVEREATING: SEVERAL DAYS
SUM OF ALL RESPONSES TO PHQ QUESTIONS 1-9: 14

## 2025-04-14 NOTE — PROGRESS NOTES
"Mary is a 30 year old who is being evaluated via a billable video visit.    How would you like to obtain your AVS? MyChart  If the video visit is dropped, the invitation should be resent by: Text to cell phone: [phone]  Will anyone else be joining your video visit? No      Assessment & Plan     Moderate episode of recurrent major depressive disorder (H)  VENESSA (generalized anxiety disorder)  PHQ score 14, VENESSA score 6.  4 days ago developed heightened emotions- was experiencing very negative symptoms: felt really angry, felt like teenage emotions, wanted to hurt self, thoughts of SI.  Had similar symptoms last month lasting for one day- felt like she wanted to die.  Cannot identify any triggers, events, relationships etc that attributed to symptoms.  No recent changes in psychiatry medications: Wellbutrin 300 mg daily, buspar 15 mg TID.  Will sometimes forget to take afternoon dose of buspar.  Today she feels much better, denies thoughts of self harm or SI.  Birth control was started mid February.  Unsure if birth control is attributing to symptoms but recommended patient to discontinue OCP.  Continue to take psychiatry medications as prescribed.  Placed referral to therapist.  Advised patient to follow up mid May to see if symptoms resurface.  Can discuss increasing Wellbutrin to 450 mg daily.  Advised empath or ED with thoughts of self harm or SI, patient verbalized understanding.   - Adult Mental Health  Referral     The longitudinal plan of care for the diagnosis(es)/condition(s) as documented were addressed during this visit. Due to the added complexity in care, I will continue to support Mary in the subsequent management and with ongoing continuity of care.    BMI  Estimated body mass index is 31.25 kg/m  as calculated from the following:    Height as of 4/4/25: 1.676 m (5' 6\").    Weight as of 4/4/25: 87.8 kg (193 lb 9.6 oz).     Depression Screening Follow Up        4/14/2025    12:53 PM   PHQ "   PHQ-9 Total Score 14   Q9: Thoughts of better off dead/self-harm past 2 weeks More than half the days         4/14/2025    12:53 PM   Last PHQ-9   1.  Little interest or pleasure in doing things 1   2.  Feeling down, depressed, or hopeless 3   3.  Trouble falling or staying asleep, or sleeping too much 2   4.  Feeling tired or having little energy 1   5.  Poor appetite or overeating 1   6.  Feeling bad about yourself 3   7.  Trouble concentrating 1   8.  Moving slowly or restless 0   Q9: Thoughts of better off dead/self-harm past 2 weeks 2   PHQ-9 Total Score 14   Difficulty at work, home, or with people Very difficult     Follow Up Actions Taken  Crisis resource information provided in the After Visit Summary  Patient to follow up with PCP.  Clinic staff to schedule appointment if able.    Discussed the following ways the patient can remain in a safe environment:  be around others    Subjective   Mary is a 30 year old, presenting for the following health issues:  Mental Health Problem        4/4/2025     7:11 AM   Additional Questions   Roomed by Mary LANDIS CMA     Mental Health Problem    History of Present Illness       Reason for visit:  Back injury at work    She eats 2-3 servings of fruits and vegetables daily.She consumes 0 sweetened beverage(s) daily.She exercises with enough effort to increase her heart rate 9 or less minutes per day.  She exercises with enough effort to increase her heart rate 3 or less days per week.   She is taking medications regularly.          Patient is a 30-year old female presenting on video visit for concerns of depressive symptoms.  Medical history includes migraines, depression, anxiety, breast nodule.     -4 days ago had drastic change in feelings lasting for 3 days  -was experiencing very negative symptoms: felt really angry, felt like teenage emotions, wanted to hurt self, thoughts of SI  -she had completed evisit for symptoms, had advised patient to have in person or  virtual visit  -advised empath or ED with thoughts of self harm or SI  -patient denies engaging in self harm or SI thoughts  -she did turn her phone off for period of time  -did reach out to couple of friends regarding emotions  -today she feels much better, like more back to her self  -cannot identify any triggers, events, relationships etc that attributed to symptoms.   -has more concerns regarding depression than anxiety  -only took hydroxyzine once this past month (usually takes with migraines)  -mental health and medications have been managed by virtual provider  -no recent changes in psychiatry medications: Wellbutrin 300 mg daily, buspar 15 mg TID.  Will sometimes forget to take afternoon dose of buspar.    -had similar symptoms last month lasting for one day- felt like she wanted to die  -started birth control in mid February   -symptoms have been after menstrual cycle  -was given therapy referral prior but did not follow up    Review of Systems  Review of systems negative otherwise known HPI.    No past medical history on file.    Past Surgical History:   Procedure Laterality Date    BREAST SURGERY  2/13/2021       No family history on file.    Social History     Tobacco Use    Smoking status: Never    Smokeless tobacco: Never   Substance Use Topics    Alcohol use: Not Currently     Current Outpatient Medications   Medication Sig Dispense Refill    amitriptyline (ELAVIL) 10 MG tablet Take 10 mg by mouth daily.      buPROPion (WELLBUTRIN XL) 300 MG 24 hr tablet Take 300 mg by mouth.      busPIRone (BUSPAR) 15 MG tablet Take 15 mg by mouth 3 times daily.      cyclobenzaprine (FLEXERIL) 5 MG tablet Take 1 tablet (5 mg) by mouth 3 times daily as needed for muscle spasms. 30 tablet 0    hydrOXYzine macarena (VISTARIL) 25 MG capsule Take 25 mg by mouth 3 times daily as needed (migraine).      norgestim-eth estrad triphasic (TRI-SPRINTEC) 0.18/0.215/0.25 MG-35 MCG tablet Take 1 tablet by mouth daily. 56 tablet 6     No  current facility-administered medications for this visit.     Objective       Vitals:  No vitals were obtained today due to virtual visit.    Physical Exam   GENERAL: alert and no distress  EYES: Eyes grossly normal to inspection.  No discharge or erythema, or obvious scleral/conjunctival abnormalities.  RESP: No audible wheeze, cough, or visible cyanosis.    SKIN: Visible skin clear. No significant rash, abnormal pigmentation or lesions.  NEURO: Cranial nerves grossly intact.  Mentation and speech appropriate for age.  PSYCH: Appropriate affect, tone, and pace of words      Video-Visit Details    Type of service:  Video Visit   Originating Location (pt. Location): Home    Distant Location (provider location):  On-site  Platform used for Video Visit: Mario Alberto  Signed Electronically by: BROCK Escalera CNP

## 2025-04-28 ENCOUNTER — THERAPY VISIT (OUTPATIENT)
Dept: PHYSICAL THERAPY | Facility: REHABILITATION | Age: 30
End: 2025-04-28
Payer: OTHER MISCELLANEOUS

## 2025-04-28 DIAGNOSIS — M54.50 ACUTE LOW BACK PAIN WITHOUT SCIATICA, UNSPECIFIED BACK PAIN LATERALITY: ICD-10-CM

## 2025-04-28 PROCEDURE — 97140 MANUAL THERAPY 1/> REGIONS: CPT | Mod: GP

## 2025-04-28 PROCEDURE — 97110 THERAPEUTIC EXERCISES: CPT | Mod: GP

## 2025-04-28 PROCEDURE — 97161 PT EVAL LOW COMPLEX 20 MIN: CPT | Mod: GP

## 2025-04-28 NOTE — PROGRESS NOTES
PHYSICAL THERAPY EVALUATION  Type of Visit: Evaluation       Fall Risk Screen:  Have you fallen 2 or more times in the past year?: No  Have you fallen and had an injury in the past year?: No  Is patient receiving Physical Therapy Services?: Yes  Fall screen comments: no falls    Subjective         Presenting condition or subjective complaint:      Pt presents to PT for eval and treat for lumbar pain that started last month when she had to shovel snow at work for multiple hours. She states that initially her pain was very high and then decreased over the next couple days. But soon after the pain resolved, her pain increased and was so severe that she went to . She found that prednisone was significantly helpful. If she stands for a long time or lays on the floor her pain will increase.  Other things that increase symptoms: stretching (from youtube videos) were initially painful after her injury. Ice and heat make symptoms better temporarily. She states that sometimes her pain will feel pinchy, sharp, or compressed. Usually her pain sits at a low level of constant pain, but can get more intense with activity. She does feel like her pain has improved significantly since her injury. Pain used to bother her sleep, but not as much anymore. Her back is still slightly painful when trying to sleep, but she is able to fall asleep. Highest pain since going to : 3-4/10, lowest pain: 0/10. Denies numbness and tingling in her legs.    Date of onset: 03/16/25 (order date)    Relevant medical history:     Dates & types of surgery:      Prior diagnostic imaging/testing results:       Prior therapy history for the same diagnosis, illness or injury: No      Prior Level of Function  Transfers: Independent  Ambulation: Independent  ADL: Independent  IADL:     Living Environment  Social support: Alone   Type of home: Apartment/condo   Stairs to enter the home: Yes 24 Is there a railing: Yes     Ramp: No   Stairs inside the home: No        Help at home: None  Equipment owned:       Employment: Yes   Hobbies/Interests:      Patient goals for therapy:      Pain assessment: See objective evaluation for additional pain details     Objective   LUMBAR SPINE EVALUATION  PAIN: Pain Frequency: intermittent or constant  INTEGUMENTARY (edema, incisions): WNL  POSTURE: Sitting Posture: Forward head, rounded shoulders  GAIT: WFL, B toe out    ROM:   (Degrees) Left AROM Left PROM  Right AROM Right PROM   Hip Flexion       Hip Extension       Hip Abduction       Hip Adduction       Hip Internal Rotation       Hip External Rotation       Knee Flexion       Knee Extension       Lumbar Side glide Decreased ~ 10% wnl   Lumbar Flexion Fingertips to mid shin, relief of symptoms   Lumbar Extension Wfl, increased tension   Pain: slight discomfort with extension  PELVIC/SI SCREEN:   STRENGTH:   Pain: - none + mild ++ moderate +++ severe  Strength Scale: 0-5/5 Left Right   Hip Flexion 4 4   Hip Extension 4+ 4+   Hip Abduction 5- 5-   Hip Adduction 4+ 4+   Hip Internal Rotation 5- 5-   Hip External Rotation 5- 5-   Knee Flexion 5- 5-   Knee Extension 5 5      DERMATOMES: WNL  NEURAL TENSION:   FLEXIBILITY: Decreased piriformis L, Decreased hip flexors L, Decreased quadriceps L, Decreased hamstrings L, Decreased piriformis R, Decreased hip flexors R, Decreased quadriceps R, Decreased hamstrings R  LUMBAR/HIP Special Tests:    Left Right   CLYDE Negative  Negative    FADIR/Labrum/ROBERT Negative  Negative    Femoral Nerve     Bertha's     Piriformis Negative  Negative    Quadrant Testing     SLR Negative  Negative    Slump     Stork with Extension     Jorge             PALPATION: pt states she does not have tenderness to palpation, but she does have pain in the areas marked (+) below  + Tenderness At Location Left Right   Quadratus Lumborum + +   Erector Spinae + +   Piriformis  + +   PSIS + +   ASIS     Iliac Crest     Glut Medius     Greater  Trochanter     Ischial Tuberosity     Hamstrings     Hip Flexors     Vertebral        Assessment & Plan   CLINICAL IMPRESSIONS  Medical Diagnosis: Acute low back pain without sciatica, unspecified back pain laterality    Treatment Diagnosis: Lumbar pain, weakness, poor posture   Impression/Assessment: Patient is a 30 year old female with bilateral lumbar pain complaints.  The following significant findings have been identified: Pain, Decreased ROM/flexibility, Decreased joint mobility, Decreased strength, Impaired muscle performance, Decreased activity tolerance, and Impaired posture. These impairments interfere with their ability to perform self care tasks, work tasks, recreational activities, household chores, and community mobility as compared to previous level of function.     Clinical Decision Making (Complexity):  Clinical Presentation: Stable/Uncomplicated  Clinical Presentation Rationale: based on medical and personal factors listed in PT evaluation  Clinical Decision Making (Complexity): Low complexity    PLAN OF CARE  Treatment Interventions:  Modalities: Cupping, Dry Needling, E-stim  Interventions: Manual Therapy, Neuromuscular Re-education, Therapeutic Activity, Therapeutic Exercise, Self-Care/Home Management    Long Term Goals     PT Goal 1  Goal Identifier: HEP  Goal Description: Pt will demonstrate understanding and independece of HEP in 30 days.  Rationale: to maximize safety and independence with performance of ADLs and functional tasks  Goal Progress: initial  Target Date: 05/27/25  PT Goal 2  Goal Identifier: RONALD  Goal Description: Pt will improve RONALD by at least 4 points by the end of therapy in order to demonstrate improved functional mobility and improved ADLs.  Goal Progress: initial  Target Date: 07/26/25  PT Goal 3  Goal Identifier: standing  Goal Description: Pt will be able to stand > 30 minutes while reporting < 2/10 symptoms by the end of therapy in order to improve daily  functioning.  Goal Progress: initial  Target Date: 07/26/25      Frequency of Treatment: 1 x / week  Duration of Treatment: 90 days    Recommended Referrals to Other Professionals:   Education Assessment:   Learner/Method: Patient;Demonstration;Pictures/Video  Education Comments: discussed therapy POC, edu pt on dx and expected outcomes, answered all of pts questions    Risks and benefits of evaluation/treatment have been explained.   Patient/Family/caregiver agrees with Plan of Care.     Evaluation Time:     PT Eval, Low Complexity Minutes (87202): 22       Signing Clinician: Allison Patel PT

## 2025-06-23 ENCOUNTER — OFFICE VISIT (OUTPATIENT)
Dept: CARDIOLOGY | Facility: CLINIC | Age: 30
End: 2025-06-23
Payer: COMMERCIAL

## 2025-06-23 VITALS
WEIGHT: 190 LBS | HEIGHT: 67 IN | BODY MASS INDEX: 29.82 KG/M2 | DIASTOLIC BLOOD PRESSURE: 70 MMHG | RESPIRATION RATE: 16 BRPM | SYSTOLIC BLOOD PRESSURE: 108 MMHG | HEART RATE: 95 BPM

## 2025-06-23 DIAGNOSIS — R55 POSTURAL DIZZINESS WITH NEAR SYNCOPE: Primary | ICD-10-CM

## 2025-06-23 DIAGNOSIS — F41.1 GAD (GENERALIZED ANXIETY DISORDER): ICD-10-CM

## 2025-06-23 DIAGNOSIS — R42 POSTURAL DIZZINESS WITH NEAR SYNCOPE: Primary | ICD-10-CM

## 2025-06-23 DIAGNOSIS — F33.1 MODERATE EPISODE OF RECURRENT MAJOR DEPRESSIVE DISORDER (H): ICD-10-CM

## 2025-06-23 PROCEDURE — 3074F SYST BP LT 130 MM HG: CPT | Performed by: INTERNAL MEDICINE

## 2025-06-23 PROCEDURE — 99204 OFFICE O/P NEW MOD 45 MIN: CPT | Performed by: INTERNAL MEDICINE

## 2025-06-23 PROCEDURE — 3078F DIAST BP <80 MM HG: CPT | Performed by: INTERNAL MEDICINE

## 2025-06-23 NOTE — PATIENT INSTRUCTIONS
Push  ounces of non-caffeinated fluids daily to maintain good hydration. Push even more fluids on hot days  Liberalize salt or sodium in your diet  Wear knee high compression stockings to limit pooling of blood in the legs  Continue with aerobic exercise 40--50 minutes 4-5 days per week to help improve autonomic tone  Consider referral to [facility] clinic for autonomic testing and echo to make sure heart structure and function are normal.

## 2025-06-23 NOTE — LETTER
6/23/2025    BROCK Escalera CNP  [address]  [facility] [address]    RE: Mary Lema       Dear Colleague,     I had the pleasure of seeing Mary Lema in the [facility].      Thank you, Amalia Marques CNP, for asking the [facility] team to see Ms. Mary Lema to evaluate for possible POTS.    Assessment/Recommendations   Assessment:    1.  Possible POTS.  Patient reports symptoms of rapid heart rate with standing along with feeling of near syncope, although has not had a gino syncopal episode.  Does also report symptoms of brain fog, tremulousness but has a history of depression and anxiety for which she is treated with several medications, number of which can cause similar symptoms.  Told her that POTS is not caused by a cardiac issue but due to autonomic dysfunction which unfortunately we do not test for here.  She would need to be referred to the [facility] either for tilt table testing or autonomic reflex testing.  I also told her there is no magic treatment for POTS and most treatment is focused on adequate hydration, salt loading and regular exercise along with use of compression stockings.  If those things do not offer significant benefits, medical therapy may be initiated.  2.  Depression and anxiety, currently treated with 3 medications.  Recommended follow-up with psychiatry to see if any change in medication needed.      Plan:  1.  Continue current medications for now but would discuss above symptoms with psychiatry to see if any changes in medication warranted.  2.  Encourage patient to consume 80 to 100 ounces of noncaffeinated fluid daily, with more fluid during high heat, salt loading, and wearing knee-high compression stockings.  We also talked about the importance of regular aerobic exercise encouraged 40 minutes of aerobic exercise 4 to 5 days/week to help improve autonomic tone.  3.  Consider echocardiogram to exclude  "any structural abnormality.  Could refer to the [facility] for either tilt table testing or autonomic reflex testing.       History of Present Illness    Ms. Mary Lema is a 30 year old female with history of depression and anxiety currently on 3 medications who presents to cardiology for possible consideration of POTS.  She reports multiple symptoms including tachycardia with standing, brain fog, tremulousness and near syncope.  Has had no gino syncopal episodes.  States symptoms are worse in the heat.  She does try to exercise but notes high heart rates with exercise which are above what she would expect.  No symptoms of chest discomfort.  Reports no family history of premature coronary artery disease or sudden cardiac death.  Denies significant caffeine intake.  Has been abstinent from alcohol for a year down.    ECG (personally reviewed): No ECG today    Cardiac Imaging Studies (personally reviewed): No previous cardiac imaging     Physical Examination Review of Systems   /70 (BP Location: Left arm, Patient Position: Sitting, Cuff Size: Adult Regular)   Pulse 95   Resp 16   Ht 1.702 m (5' 7\")   Wt 86.2 kg (190 lb)   BMI 29.76 kg/m    Body mass index is 29.76 kg/m .  Wt Readings from Last 3 Encounters:   06/23/25 86.2 kg (190 lb)   04/04/25 87.8 kg (193 lb 9.6 oz)   02/21/25 83 kg (183 lb)     General Appearance:   Awake, Alert, No acute distress.   HEENT:  No scleral icterus; the mucous membranes were pink and moist.   Neck: No cervical bruits or jugular venous distention    Chest: The spine was straight. The chest was symmetric.   Lungs:   Respirations unlabored; the lungs are clear to auscultation. No wheezing   Cardiovascular:   Regular rate and rhythm.  S1, S2 normal.  No murmur or gallop   Abdomen:  No organomegaly, masses, bruits, or tenderness. Bowels sounds are present   Extremities: No peripheral edema   Skin: No xanthelasma. Warm, Dry.   Musculoskeletal: No tenderness. " "  Neurologic: Mood and affect are appropriate.    Encounter Vitals  BP: 108/70  Pulse: 95  Resp: 16  Weight: 86.2 kg (190 lb)  Height: 170.2 cm (5' 7\")                                         Medical History  Surgical History Family History Social History   Past Medical History:   Diagnosis Date     Syncope     Past Surgical History:   Procedure Laterality Date     BREAST SURGERY  2/13/2021    History reviewed. No pertinent family history. Social History     Socioeconomic History     Marital status: Single     Spouse name: Not on file     Number of children: Not on file     Years of education: Not on file     Highest education level: Not on file   Occupational History     Not on file   Tobacco Use     Smoking status: Never     Smokeless tobacco: Never   Vaping Use     Vaping status: Some Days   Substance and Sexual Activity     Alcohol use: Not Currently     Drug use: Never     Sexual activity: Yes     Partners: Male     Birth control/protection: Abstinence, Pill   Other Topics Concern     Parent/sibling w/ CABG, MI or angioplasty before 65F 55M? No   Social History Narrative     Not on file     Social Drivers of Health     Financial Resource Strain: Low Risk  (1/2/2025)    Financial Resource Strain      Within the past 12 months, have you or your family members you live with been unable to get utilities (heat, electricity) when it was really needed?: No   Food Insecurity: Low Risk  (1/2/2025)    Food Insecurity      Within the past 12 months, did you worry that your food would run out before you got money to buy more?: No      Within the past 12 months, did the food you bought just not last and you didn t have money to get more?: No   Transportation Needs: Low Risk  (1/2/2025)    Transportation Needs      Within the past 12 months, has lack of transportation kept you from medical appointments, getting your medicines, non-medical meetings or appointments, work, or from getting things that you need?: No   Physical " Activity: Not on file   Stress: Not on file   Social Connections: Socially Integrated (5/13/2024)    [facility]    Social Connections      Do you often feel lonely or isolated from those around you?: 0   Interpersonal Safety: Low Risk  (1/2/2025)    Interpersonal Safety      Do you feel physically and emotionally safe where you currently live?: Yes      Within the past 12 months, have you been hit, slapped, kicked or otherwise physically hurt by someone?: No      Within the past 12 months, have you been humiliated or emotionally abused in other ways by your partner or ex-partner?: No   Housing Stability: Low Risk  (1/2/2025)    Housing Stability      Do you have housing? : Yes      Are you worried about losing your housing?: No          Medications  Allergies   Current Outpatient Medications   Medication Sig Dispense Refill     amitriptyline (ELAVIL) 10 MG tablet Take 10 mg by mouth daily.       buPROPion (WELLBUTRIN XL) 300 MG 24 hr tablet Take 300 mg by mouth.       busPIRone (BUSPAR) 15 MG tablet Take 15 mg by mouth 3 times daily.       cyclobenzaprine (FLEXERIL) 5 MG tablet Take 1 tablet (5 mg) by mouth 3 times daily as needed for muscle spasms. 30 tablet 0     hydrOXYzine macarena (VISTARIL) 25 MG capsule Take 25 mg by mouth 3 times daily as needed (migraine).        No Known Allergies      Lab Results    Chemistry/lipid CBC Cardiac Enzymes/BNP/TSH/INR   Recent Labs   Lab Test 02/11/25  0046   BUN 16.4      CO2 19*    Recent Labs   Lab Test 02/11/25  0046   WBC 21.7*   HGB 15.9*   HCT 45.4   MCV 87       Recent Labs   Lab Test 12/26/24  1346   TSH 1.25           The longitudinal plan of care for the diagnosis(es)/condition(s) as documented were addressed during this visit. Due to the added complexity in care, I will continue to support Mary in the subsequent management and with ongoing continuity of care.                      Thank you for allowing me to  participate in the care of your patient.      Sincerely,     Vika Stephens MD     [facility]  cc:   Referred Self, MD  No address on file

## 2025-06-23 NOTE — PROGRESS NOTES
Thank you, Amalia Marques, HIGINIO, for asking the [facility] team to see Ms. Mary Lema to evaluate for possible POTS.    Assessment/Recommendations   Assessment:    1.  Possible POTS.  Patient reports symptoms of rapid heart rate with standing along with feeling of near syncope, although has not had a gino syncopal episode.  Does also report symptoms of brain fog, tremulousness but has a history of depression and anxiety for which she is treated with several medications, number of which can cause similar symptoms.  Told her that POTS is not caused by a cardiac issue but due to autonomic dysfunction which unfortunately we do not test for here.  She would need to be referred to the [facility] either for tilt table testing or autonomic reflex testing.  I also told her there is no magic treatment for POTS and most treatment is focused on adequate hydration, salt loading and regular exercise along with use of compression stockings.  If those things do not offer significant benefits, medical therapy may be initiated.  2.  Depression and anxiety, currently treated with 3 medications.  Recommended follow-up with psychiatry to see if any change in medication needed.      Plan:  1.  Continue current medications for now but would discuss above symptoms with psychiatry to see if any changes in medication warranted.  2.  Encourage patient to consume 80 to 100 ounces of noncaffeinated fluid daily, with more fluid during high heat, salt loading, and wearing knee-high compression stockings.  We also talked about the importance of regular aerobic exercise encouraged 40 minutes of aerobic exercise 4 to 5 days/week to help improve autonomic tone.  3.  Consider echocardiogram to exclude any structural abnormality.  Could refer to the [facility] for either tilt table testing or autonomic reflex testing.       History of Present Illness    Ms. Mary Lema is a 30 year old female with  "history of depression and anxiety currently on 3 medications who presents to cardiology for possible consideration of POTS.  She reports multiple symptoms including tachycardia with standing, brain fog, tremulousness and near syncope.  Has had no gino syncopal episodes.  States symptoms are worse in the heat.  She does try to exercise but notes high heart rates with exercise which are above what she would expect.  No symptoms of chest discomfort.  Reports no family history of premature coronary artery disease or sudden cardiac death.  Denies significant caffeine intake.  Has been abstinent from alcohol for a year down.    ECG (personally reviewed): No ECG today    Cardiac Imaging Studies (personally reviewed): No previous cardiac imaging     Physical Examination Review of Systems   /70 (BP Location: Left arm, Patient Position: Sitting, Cuff Size: Adult Regular)   Pulse 95   Resp 16   Ht 1.702 m (5' 7\")   Wt 86.2 kg (190 lb)   BMI 29.76 kg/m    Body mass index is 29.76 kg/m .  Wt Readings from Last 3 Encounters:   06/23/25 86.2 kg (190 lb)   04/04/25 87.8 kg (193 lb 9.6 oz)   02/21/25 83 kg (183 lb)     General Appearance:   Awake, Alert, No acute distress.   HEENT:  No scleral icterus; the mucous membranes were pink and moist.   Neck: No cervical bruits or jugular venous distention    Chest: The spine was straight. The chest was symmetric.   Lungs:   Respirations unlabored; the lungs are clear to auscultation. No wheezing   Cardiovascular:   Regular rate and rhythm.  S1, S2 normal.  No murmur or gallop   Abdomen:  No organomegaly, masses, bruits, or tenderness. Bowels sounds are present   Extremities: No peripheral edema   Skin: No xanthelasma. Warm, Dry.   Musculoskeletal: No tenderness.   Neurologic: Mood and affect are appropriate.    Encounter Vitals  BP: 108/70  Pulse: 95  Resp: 16  Weight: 86.2 kg (190 lb)  Height: 170.2 cm (5' 7\")                                         Medical History  Surgical " History Family History Social History   Past Medical History:   Diagnosis Date    Syncope     Past Surgical History:   Procedure Laterality Date    BREAST SURGERY  2/13/2021    History reviewed. No pertinent family history. Social History     Socioeconomic History    Marital status: Single     Spouse name: Not on file    Number of children: Not on file    Years of education: Not on file    Highest education level: Not on file   Occupational History    Not on file   Tobacco Use    Smoking status: Never    Smokeless tobacco: Never   Vaping Use    Vaping status: Some Days   Substance and Sexual Activity    Alcohol use: Not Currently    Drug use: Never    Sexual activity: Yes     Partners: Male     Birth control/protection: Abstinence, Pill   Other Topics Concern    Parent/sibling w/ CABG, MI or angioplasty before 65F 55M? No   Social History Narrative    Not on file     Social Drivers of Health     Financial Resource Strain: Low Risk  (1/2/2025)    Financial Resource Strain     Within the past 12 months, have you or your family members you live with been unable to get utilities (heat, electricity) when it was really needed?: No   Food Insecurity: Low Risk  (1/2/2025)    Food Insecurity     Within the past 12 months, did you worry that your food would run out before you got money to buy more?: No     Within the past 12 months, did the food you bought just not last and you didn t have money to get more?: No   Transportation Needs: Low Risk  (1/2/2025)    Transportation Needs     Within the past 12 months, has lack of transportation kept you from medical appointments, getting your medicines, non-medical meetings or appointments, work, or from getting things that you need?: No   Physical Activity: Not on file   Stress: Not on file   Social Connections: Socially Integrated (5/13/2024)    [facility]    Social Connections     Do you often feel lonely or isolated from those  around you?: 0   Interpersonal Safety: Low Risk  (1/2/2025)    Interpersonal Safety     Do you feel physically and emotionally safe where you currently live?: Yes     Within the past 12 months, have you been hit, slapped, kicked or otherwise physically hurt by someone?: No     Within the past 12 months, have you been humiliated or emotionally abused in other ways by your partner or ex-partner?: No   Housing Stability: Low Risk  (1/2/2025)    Housing Stability     Do you have housing? : Yes     Are you worried about losing your housing?: No          Medications  Allergies   Current Outpatient Medications   Medication Sig Dispense Refill    amitriptyline (ELAVIL) 10 MG tablet Take 10 mg by mouth daily.      buPROPion (WELLBUTRIN XL) 300 MG 24 hr tablet Take 300 mg by mouth.      busPIRone (BUSPAR) 15 MG tablet Take 15 mg by mouth 3 times daily.      cyclobenzaprine (FLEXERIL) 5 MG tablet Take 1 tablet (5 mg) by mouth 3 times daily as needed for muscle spasms. 30 tablet 0    hydrOXYzine macarena (VISTARIL) 25 MG capsule Take 25 mg by mouth 3 times daily as needed (migraine).        No Known Allergies      Lab Results    Chemistry/lipid CBC Cardiac Enzymes/BNP/TSH/INR   Recent Labs   Lab Test 02/11/25  0046   BUN 16.4      CO2 19*    Recent Labs   Lab Test 02/11/25  0046   WBC 21.7*   HGB 15.9*   HCT 45.4   MCV 87       Recent Labs   Lab Test 12/26/24  1346   TSH 1.25           The longitudinal plan of care for the diagnosis(es)/condition(s) as documented were addressed during this visit. Due to the added complexity in care, I will continue to support Mary in the subsequent management and with ongoing continuity of care.

## 2025-07-08 ENCOUNTER — PATIENT OUTREACH (OUTPATIENT)
Dept: CARE COORDINATION | Facility: CLINIC | Age: 30
End: 2025-07-08
Payer: COMMERCIAL

## 2025-07-22 ENCOUNTER — PATIENT OUTREACH (OUTPATIENT)
Dept: CARE COORDINATION | Facility: CLINIC | Age: 30
End: 2025-07-22
Payer: COMMERCIAL

## 2025-08-07 ENCOUNTER — VIRTUAL VISIT (OUTPATIENT)
Dept: CARDIOLOGY | Facility: CLINIC | Age: 30
End: 2025-08-07
Attending: INTERNAL MEDICINE
Payer: COMMERCIAL

## 2025-08-07 VITALS — BODY MASS INDEX: 28.25 KG/M2 | HEIGHT: 67 IN | WEIGHT: 180 LBS

## 2025-08-07 DIAGNOSIS — G90.A POSTURAL ORTHOSTATIC TACHYCARDIA SYNDROME: Primary | ICD-10-CM

## 2025-08-07 DIAGNOSIS — R00.0 SINUS TACHYCARDIA: ICD-10-CM

## 2025-08-07 RX ORDER — SODIUM CHLORIDE 9 MG/ML
INJECTION, SOLUTION INTRAVENOUS CONTINUOUS
OUTPATIENT
Start: 2025-08-07

## 2025-08-07 RX ORDER — LIDOCAINE 40 MG/G
CREAM TOPICAL
OUTPATIENT
Start: 2025-08-07

## 2025-08-07 ASSESSMENT — PAIN SCALES - GENERAL: PAINLEVEL_OUTOF10: MILD PAIN (2)

## 2025-08-13 ENCOUNTER — MYC MEDICAL ADVICE (OUTPATIENT)
Dept: FAMILY MEDICINE | Facility: CLINIC | Age: 30
End: 2025-08-13
Payer: COMMERCIAL

## 2025-08-13 DIAGNOSIS — R73.09 HIGH GLUCOSE: ICD-10-CM

## 2025-08-13 DIAGNOSIS — Z00.00 ROUTINE HISTORY AND PHYSICAL EXAMINATION OF ADULT: Primary | ICD-10-CM
